# Patient Record
Sex: MALE | Race: WHITE
[De-identification: names, ages, dates, MRNs, and addresses within clinical notes are randomized per-mention and may not be internally consistent; named-entity substitution may affect disease eponyms.]

---

## 2021-11-08 PROBLEM — Z00.00 ENCOUNTER FOR PREVENTIVE HEALTH EXAMINATION: Status: ACTIVE | Noted: 2021-11-08

## 2021-11-11 ENCOUNTER — APPOINTMENT (OUTPATIENT)
Dept: UROLOGY | Facility: CLINIC | Age: 61
End: 2021-11-11
Payer: COMMERCIAL

## 2021-11-11 VITALS
WEIGHT: 227 LBS | BODY MASS INDEX: 34.4 KG/M2 | TEMPERATURE: 96.8 F | HEART RATE: 51 BPM | SYSTOLIC BLOOD PRESSURE: 118 MMHG | HEIGHT: 68 IN | OXYGEN SATURATION: 95 % | DIASTOLIC BLOOD PRESSURE: 68 MMHG

## 2021-11-11 DIAGNOSIS — E56.9 VITAMIN DEFICIENCY, UNSPECIFIED: ICD-10-CM

## 2021-11-11 DIAGNOSIS — Z87.442 PERSONAL HISTORY OF URINARY CALCULI: ICD-10-CM

## 2021-11-11 DIAGNOSIS — Z78.9 OTHER SPECIFIED HEALTH STATUS: ICD-10-CM

## 2021-11-11 DIAGNOSIS — Z86.39 PERSONAL HISTORY OF OTHER ENDOCRINE, NUTRITIONAL AND METABOLIC DISEASE: ICD-10-CM

## 2021-11-11 DIAGNOSIS — Z86.79 PERSONAL HISTORY OF OTHER DISEASES OF THE CIRCULATORY SYSTEM: ICD-10-CM

## 2021-11-11 DIAGNOSIS — I25.2 OLD MYOCARDIAL INFARCTION: ICD-10-CM

## 2021-11-11 DIAGNOSIS — Z87.891 PERSONAL HISTORY OF NICOTINE DEPENDENCE: ICD-10-CM

## 2021-11-11 DIAGNOSIS — R35.1 NOCTURIA: ICD-10-CM

## 2021-11-11 DIAGNOSIS — U07.1 COVID-19: ICD-10-CM

## 2021-11-11 DIAGNOSIS — R73.03 PREDIABETES.: ICD-10-CM

## 2021-11-11 DIAGNOSIS — Z81.8 FAMILY HISTORY OF OTHER MENTAL AND BEHAVIORAL DISORDERS: ICD-10-CM

## 2021-11-11 DIAGNOSIS — Z83.438 FAMILY HISTORY OF OTHER DISORDER OF LIPOPROTEIN METABOLISM AND OTHER LIPIDEMIA: ICD-10-CM

## 2021-11-11 DIAGNOSIS — C61 MALIGNANT NEOPLASM OF PROSTATE: ICD-10-CM

## 2021-11-11 DIAGNOSIS — Z83.3 FAMILY HISTORY OF DIABETES MELLITUS: ICD-10-CM

## 2021-11-11 PROCEDURE — 99204 OFFICE O/P NEW MOD 45 MIN: CPT

## 2021-11-11 NOTE — HISTORY OF PRESENT ILLNESS
[FreeTextEntry1] : 60 yo male, hx of prostate cancer, 10 years ago, was placed on active surveillance, he remembers he had Gl 6 in 50% of 1 core in first biopsy, and then second followup biopsy, he had no prostate cancer in that second biopsy.\par \par that was Last biopsy for PCa, and it was 9 years ago. He never had a bx thereafter.\par Last PSA drawn: 2021 - patient mentions total PSA between 7 - 8\par never undergone MRI prostate.\par \par CC: nocturia / 2x per night, of few years duration. causes him insomnia.\par no fam hx of BPH. no fam hx of PCa.\par no intermittency, has stronf urine stream, no dysuria, no hematuria,no daytime frequency, no urgency,no incontinence.\par rarely drinks alcohol, not too much spicy food, he has cut down water consumption prior to go to bed.\par no hx of urine infections.\par PMHx: CAD s/p 2 stents, on multiple meds now.\par NOT Diabetic\par On Eliquis, Plavix.\par Ex heavy smoker ~40 years\par

## 2021-11-11 NOTE — ASSESSMENT
[FreeTextEntry1] :  61 male with a pertinent past medical history of HTN, Hyperlipid, CAD s/p 2 stent, on Eliquis, Plavix, hx of Low risk Prost Ca (per patient), was on active surveillance, 2 biopsies and then stopped, last Prost bx was 9 years ago, mentions his last PSA was between 7-8 (all info per patient) who presents today with LUTS, specifically NOCTURIA 2x per night which has caused him insomnia. \par PVR on bladder scan =   not done . \par Based on his history and our conversation:\par \par Plan:\par \par BPH/nocturia:\par US K&P for PS, PVR\par Start Tamsulosin 0.4 mg QHS\par Lifestyle modifications per below\par \par PCa:\par PSA free/total\par mpMRI prostate\par RTC 3 weeks\par \par Finasteride or Dutasteride if PSA >1.5 or Size >40cc\par -Anticholinergics if predominant Sx Urgency and PVR <250-300 (use with caution in these patients)\par -No evidence for efficacy of Saw palmetto or Urtica Doicia in management of LUTS\par \par -Plan for nocturia: Complete a Frequency and Volume Chart\par \par -Lifestyle Modifications for Frequency and Nocturia: \par Keep legs elevated 1-2 hours prior to sleep. Urinate prior to bed. \par Limit Caffeine, Spicy foods, alcohol and other bladder irritants. \par Do not drink water for 3 hours prior to bedtime.\par -Lifestyle Modifications for urgency: Timed voiding, Pelvic floor contractions\par \par \par \par \par \par \par \par \par \par

## 2021-11-11 NOTE — LETTER BODY
[Dear  ___] : Dear  [unfilled], [Consult Letter:] : I had the pleasure of evaluating your patient, [unfilled]. [Please see my note below.] : Please see my note below. [Sincerely,] : Sincerely, [FreeTextEntry3] : Destin Mcmahan MD\par Urologic Oncology\par [facility]\par [facility] [place] [facility] [place]\par [facility]\par

## 2021-11-11 NOTE — REVIEW OF SYSTEMS
[Urine Infection (bladder/kidney)] : bladder/kidney infection [History of kidney stones] : history of kidney stones [Urine retention] : urine retention [Wake up at night to urinate  How many times?  ___] : wakes up to urinate [unfilled] times during the night [Strong urge to urinate] : strong urge to urinate [Pain during urination] : denies pain during urination [Pain at onset of urination] : denies pain during onset of urination [Pain after urination] : denies pain after urination [Blood in urine that you can see] : denies seeing blood in urine [Told you have blood in urine on a urine test] : denies being told that blood was present in a urine test [Discharge from urine canal] : denies discharge from urine canal [Bladder pressure] : denies bladder pressure [Strain or push to urinate] : denies straining or pushing to urinate [Wait a long time to urinate] : denies waiting a long time to urinate [Slow urine stream] : denies slow urine stream [Interrupted urine stream] : denies interrupted urine stream [Bladder fullness after urinating] : denies bladder fullness after urinating [Increased pain/discomfort with bladder filling] : denies increased pain/discomfort with bladder filling [Bladder problems as child. If yes, describe..] : denies bladder problems as child [Leakage of urine with straining, coughing, laughing] : denies leakage of urine with straining, coughing, and/or laughing [Unaware of when urine is leaking] : aware of when urine is leaking

## 2021-11-11 NOTE — PHYSICAL EXAM
[General Appearance - Well Developed] : well developed [Heart Rate And Rhythm] : Heart rate and rhythm were normal [] : no respiratory distress [Bowel Sounds] : normal bowel sounds [Urinary Bladder Findings] : the bladder was normal on palpation [Normal Station and Gait] : the gait and station were normal for the patient's age [Skin Color & Pigmentation] : normal skin color and pigmentation [No Focal Deficits] : no focal deficits [FreeTextEntry1] : MASHA:

## 2021-11-30 RX ORDER — TAMSULOSIN HYDROCHLORIDE 0.4 MG/1
0.4 CAPSULE ORAL
Qty: 30 | Refills: 2 | Status: DISCONTINUED | COMMUNITY
Start: 2021-11-11 | End: 2021-11-30

## 2023-05-02 ENCOUNTER — FORM ENCOUNTER (OUTPATIENT)
Age: 63
End: 2023-05-02

## 2023-05-04 ENCOUNTER — APPOINTMENT (OUTPATIENT)
Dept: UROLOGY | Facility: CLINIC | Age: 63
End: 2023-05-04
Payer: MEDICAID

## 2023-05-04 DIAGNOSIS — C61 MALIGNANT NEOPLASM OF PROSTATE: ICD-10-CM

## 2023-05-04 DIAGNOSIS — N28.89 OTHER SPECIFIED DISORDERS OF KIDNEY AND URETER: ICD-10-CM

## 2023-05-04 PROCEDURE — 99214 OFFICE O/P EST MOD 30 MIN: CPT

## 2023-05-04 NOTE — ASSESSMENT
[FreeTextEntry1] :  61 male with a pertinent past medical history of HTN, Hyperlipid, CAD s/p 2 stent, on Eliquis, Plavix, hx of Low risk Prost Ca (per patient), was on active surveillance, 2 biopsies and then stopped, last Prost bx was 9 years ago, mentions his last PSA was between 7-8 (all info per patient) who presents today with LUTS, specifically NOCTURIA 2x per night which has caused him insomnia. \par \par US KB: left midpole renal cyst with internal septations 1.9 x 2.1 cm / PVR=8cc / PS = 39 g\par may 2023: new PSA=8.1\par \par Plan:\par MRI abdomen for renal cyst\par MRI prostate\par F/T PSA\par RTC 3 weeks\par \par We spent the bulk of our time discussing active surveillance with delayed curative intent. \par Active surveillance was described as a reasonable management strategy which has been associated with excellent short and intermediate term cancer control. The concept is to keep a watchful eye on the cancer and intervene as needed at any meaningful sign of increased tumor volume or increased grade. Delayed treatment has been associated with similar outcomes to immediate treatment. Approximately 5% of patients per year require an intervention and discontinue active surveillance. I explained there is consistent data showing a 98% cancer-specific survival at 10 years following the diagnosis but data beyond ten years is not currently not available. He understands there is a chance of missing his window of curative opportunity. Lastly, I discussed the rationale of a re-biopsy of his prostate if he is interested in this approach since approximately 20% to 30% of patients will have higher grade or higher volume disease which exclude them from considering active surveillance.\par \par

## 2023-05-04 NOTE — PHYSICAL EXAM
[General Appearance - Well Developed] : well developed [Heart Rate And Rhythm] : Heart rate and rhythm were normal [Bowel Sounds] : normal bowel sounds [] : no respiratory distress [Urinary Bladder Findings] : the bladder was normal on palpation [Normal Station and Gait] : the gait and station were normal for the patient's age [Skin Color & Pigmentation] : normal skin color and pigmentation [No Focal Deficits] : no focal deficits [FreeTextEntry1] : MASHA:

## 2023-05-04 NOTE — LETTER BODY
[Consult Letter:] : I had the pleasure of evaluating your patient, [unfilled]. [Please see my note below.] : Please see my note below. [Sincerely,] : Sincerely, [Dear  ___] : Dear  [unfilled], [FreeTextEntry3] : Destin Mcmahan MD\par Urologic Oncology\par [facility]\par [facility] [place] [facility] [place]\par [facility]\par

## 2023-05-04 NOTE — HISTORY OF PRESENT ILLNESS
[FreeTextEntry1] : 63 yo male, hx of prostate cancer, 11 years ago, was placed on active surveillance, he remembers he had Gl 6 in 50% of 1 core in first biopsy, and then second followup biopsy, he had no prostate cancer in that second biopsy.\par that was Last biopsy for PCa, and it was 9 years ago. He never had a bx thereafter.\par Last PSA drawn: 2021 - patient mentions total PSA between 7 - 8\par never undergone MRI prostate.\par \par CC: nocturia / 2x per night, of few years duration. causes him insomnia.\par no fam hx of BPH. no fam hx of PCa.\par \par PMHx: CAD s/p 2 stents, on multiple meds now.\par NOT Diabetic\par On Eliquis, Plavix.\par Ex heavy smoker ~40 years\par \par US KB: left midpole renal cyst with internal septations 1.9 x 2.1 cm / PVR=8cc / PS = 39 g\par may 2023: new PSA=8.1

## 2023-05-26 ENCOUNTER — APPOINTMENT (OUTPATIENT)
Dept: UROLOGY | Facility: CLINIC | Age: 63
End: 2023-05-26

## 2024-06-26 ENCOUNTER — APPOINTMENT (OUTPATIENT)
Dept: NEUROLOGY | Facility: CLINIC | Age: 64
End: 2024-06-26
Payer: MEDICAID

## 2024-06-26 VITALS
WEIGHT: 230 LBS | OXYGEN SATURATION: 95 % | HEART RATE: 66 BPM | HEIGHT: 68 IN | SYSTOLIC BLOOD PRESSURE: 117 MMHG | DIASTOLIC BLOOD PRESSURE: 79 MMHG | BODY MASS INDEX: 34.86 KG/M2

## 2024-06-26 DIAGNOSIS — Z86.59 PERSONAL HISTORY OF OTHER MENTAL AND BEHAVIORAL DISORDERS: ICD-10-CM

## 2024-06-26 DIAGNOSIS — M62.838 OTHER MUSCLE SPASM: ICD-10-CM

## 2024-06-26 DIAGNOSIS — D17.0 BENIGN LIPOMATOUS NEOPLASM OF SKIN AND SUBCUTANEOUS TISSUE OF HEAD, FACE AND NECK: ICD-10-CM

## 2024-06-26 DIAGNOSIS — G44.209 TENSION-TYPE HEADACHE, UNSPECIFIED, NOT INTRACTABLE: ICD-10-CM

## 2024-06-26 PROCEDURE — 99205 OFFICE O/P NEW HI 60 MIN: CPT

## 2024-06-26 RX ORDER — BUPROPION HYDROCHLORIDE 300 MG/1
300 TABLET, EXTENDED RELEASE ORAL
Refills: 0 | Status: ACTIVE | COMMUNITY

## 2024-06-26 RX ORDER — METOPROLOL TARTRATE 50 MG/1
50 TABLET, FILM COATED ORAL
Refills: 0 | Status: ACTIVE | COMMUNITY

## 2024-06-26 RX ORDER — PANTOPRAZOLE SODIUM 40 MG/1
40 GRANULE, DELAYED RELEASE ORAL
Refills: 0 | Status: ACTIVE | COMMUNITY

## 2024-06-26 RX ORDER — RIVAROXABAN 20 MG/1
20 TABLET, FILM COATED ORAL
Refills: 0 | Status: ACTIVE | COMMUNITY

## 2024-06-26 RX ORDER — LOSARTAN POTASSIUM 100 MG/1
100 TABLET, FILM COATED ORAL
Refills: 0 | Status: ACTIVE | COMMUNITY

## 2024-06-26 RX ORDER — EZETIMIBE 10 MG/1
10 TABLET ORAL
Refills: 0 | Status: ACTIVE | COMMUNITY

## 2024-06-26 RX ORDER — TAMSULOSIN HYDROCHLORIDE 0.4 MG/1
0.4 CAPSULE ORAL AT BEDTIME
Qty: 90 | Refills: 2 | Status: DISCONTINUED | COMMUNITY
Start: 2021-11-30 | End: 2024-06-26

## 2024-06-26 RX ORDER — SPIRONOLACTONE 25 MG/1
25 TABLET ORAL
Refills: 0 | Status: ACTIVE | COMMUNITY

## 2024-06-26 RX ORDER — DAPAGLIFLOZIN 10 MG/1
10 TABLET, FILM COATED ORAL
Refills: 0 | Status: ACTIVE | COMMUNITY

## 2024-06-26 RX ORDER — ATORVASTATIN CALCIUM 80 MG/1
80 TABLET, FILM COATED ORAL
Refills: 0 | Status: ACTIVE | COMMUNITY

## 2024-06-26 RX ORDER — APIXABAN 5 MG/1
5 TABLET, FILM COATED ORAL
Refills: 0 | Status: ACTIVE | COMMUNITY

## 2024-06-27 PROBLEM — Z86.59 HISTORY OF CLAUSTROPHOBIA: Status: ACTIVE | Noted: 2024-06-26

## 2024-07-10 RX ORDER — UBROGEPANT 100 MG/1
100 TABLET ORAL
Qty: 16 | Refills: 5 | Status: ACTIVE | COMMUNITY
Start: 2024-07-10 | End: 1900-01-01

## 2024-08-09 PROBLEM — Z95.0 PACEMAKER: Status: ACTIVE | Noted: 2024-08-09

## 2024-08-15 ENCOUNTER — APPOINTMENT (OUTPATIENT)
Dept: NEUROLOGY | Facility: CLINIC | Age: 64
End: 2024-08-15
Payer: MEDICAID

## 2024-08-15 VITALS
WEIGHT: 229 LBS | OXYGEN SATURATION: 96 % | SYSTOLIC BLOOD PRESSURE: 130 MMHG | BODY MASS INDEX: 34.71 KG/M2 | HEART RATE: 51 BPM | DIASTOLIC BLOOD PRESSURE: 78 MMHG | HEIGHT: 68 IN

## 2024-08-15 DIAGNOSIS — G44.219 EPISODIC TENSION-TYPE HEADACHE, NOT INTRACTABLE: ICD-10-CM

## 2024-08-15 DIAGNOSIS — M62.838 OTHER MUSCLE SPASM: ICD-10-CM

## 2024-08-15 DIAGNOSIS — D17.0 BENIGN LIPOMATOUS NEOPLASM OF SKIN AND SUBCUTANEOUS TISSUE OF HEAD, FACE AND NECK: ICD-10-CM

## 2024-08-15 PROCEDURE — 99215 OFFICE O/P EST HI 40 MIN: CPT

## 2024-08-15 NOTE — DATA REVIEWED
[de-identified] : 8/13/24: FINDINGS:  There is no evidence of acute infarct. There are no foci of susceptibility artifact to suggest hemorrhage. The ventricles are normal in size for patient's age. No abnormal intracranial enhancement.  Flow voids of the major intracranial vessels at the skull base follow expected course and contour.  Small polyp or retention cyst in the left maxillary sinus. The mastoids demonstrate no signal abnormality.  Bilateral orbits are within normal limits. A 5.1 cm fat signal mass in the right posterior neck subcutaneous soft tissues suggesting a lipoma.   IMPRESSION: 1.  No acute infarct, hemorrhage, or mass effect. No abnormal intracranial enhancement. 2.  A 5.1 cm fat signal mass in the right posterior neck subcutaneous soft tissues suggesting a lipoma.

## 2024-08-15 NOTE — HISTORY OF PRESENT ILLNESS
[FreeTextEntry1] : Today 8/15/24: MRI was performed, no acute findings, does show the 5.1cm lipoma in the right posterior neck tissues.  Headaches are better, switched off the medications to essential oils (Doterra brand, combination of Black Spruce, Ho Leaf, Frankincense, Blue Tansy, Blue Chamomile, and Osmanthus.)  Headaches are mild and dull when they present, last headache was 2 weeks ago and mild.  PT was making neck pain and headaches worse; felt he was leaving with strong headaches each time. Massage also made it a lot worse. No longer doing either.      Initial Visit: Jon Duarte is a 64YO male, presenting today for headaches, referred by PCP. Hx of lipoma on his head. Pt reports headache has been for 3 weeks with pressure behind the eyes. Headaches have bene good the last few days since he has been taking baclofen. Taking it once at night. Lipoma is on the back of neck, right sided. Has not had any imaging for about 10 years. Headaches have been happening for about 20 years, but never lasting more than a day or two. He used to treat headaches with ibuprofen, but now on blood thinners for a hx of heart attacks, now has 2 stents. He has an ICD. Wants an MRI of the head so he can see plastic surgeon to have lipoma removed. Reports pressure washing his sidewalk right before the onset of headaches.   Headaches are pounding and stabbing, 8-10/10 in severity. Reports severe neck tension with the headaches. Located at the lipoma, radiating to R ear, up the Occipital region, and down the r side of neck and shoulder. Neck feels very stiff during them.   Denies any photophobia, phonophobia, nausea or vomiting.  Patient was given prescription for PT and massage by PCP.

## 2024-08-15 NOTE — ASSESSMENT
[FreeTextEntry1] : 63YO male, presenting to the office today for follow-up after MRI head. MRI head reviewed and showed 5.1cm Lipoma compressing the paraspinal muscles of the R side of the neck, which is likely the aggravating factor contributing to his frequent tension headaches on the R side. He reports that since he stopped all aggravating activities, including massage and PT, he has seen a reduction in headaches. Patient would like to follow up with plastic surgery to have the lipoma removed to see if this resolves his symptoms. Recommendations provided.  Given that patient's symptoms have improved, will follow-up as needed.